# Patient Record
Sex: MALE | Race: WHITE | NOT HISPANIC OR LATINO | URBAN - METROPOLITAN AREA
[De-identification: names, ages, dates, MRNs, and addresses within clinical notes are randomized per-mention and may not be internally consistent; named-entity substitution may affect disease eponyms.]

---

## 2018-07-20 ENCOUNTER — COMMUNICATION - HEALTHEAST (OUTPATIENT)
Dept: SCHEDULING | Facility: CLINIC | Age: 33
End: 2018-07-20

## 2018-07-21 ENCOUNTER — OFFICE VISIT - HEALTHEAST (OUTPATIENT)
Dept: FAMILY MEDICINE | Facility: CLINIC | Age: 33
End: 2018-07-21

## 2018-07-21 DIAGNOSIS — I10 HTN (HYPERTENSION): ICD-10-CM

## 2018-07-21 DIAGNOSIS — L03.90 CELLULITIS: ICD-10-CM

## 2018-07-21 DIAGNOSIS — F41.1 GAD (GENERALIZED ANXIETY DISORDER): ICD-10-CM

## 2018-07-30 ENCOUNTER — OFFICE VISIT - HEALTHEAST (OUTPATIENT)
Dept: FAMILY MEDICINE | Facility: CLINIC | Age: 33
End: 2018-07-30

## 2018-07-30 DIAGNOSIS — I10 HYPERTENSION, UNSPECIFIED TYPE: ICD-10-CM

## 2018-07-30 DIAGNOSIS — E66.09 CLASS 2 OBESITY DUE TO EXCESS CALORIES WITHOUT SERIOUS COMORBIDITY WITH BODY MASS INDEX (BMI) OF 38.0 TO 38.9 IN ADULT: ICD-10-CM

## 2018-07-30 DIAGNOSIS — L40.3 PUSTULOSIS PALMARIS ET PLANTARIS: ICD-10-CM

## 2018-07-30 DIAGNOSIS — Z11.4 ENCOUNTER FOR SCREENING FOR HIV: ICD-10-CM

## 2018-07-30 DIAGNOSIS — F41.9 ANXIETY: ICD-10-CM

## 2018-07-30 DIAGNOSIS — E66.812 CLASS 2 OBESITY DUE TO EXCESS CALORIES WITHOUT SERIOUS COMORBIDITY WITH BODY MASS INDEX (BMI) OF 38.0 TO 38.9 IN ADULT: ICD-10-CM

## 2018-07-30 DIAGNOSIS — R21 RASH AND NONSPECIFIC SKIN ERUPTION: ICD-10-CM

## 2018-07-30 LAB
ALBUMIN SERPL-MCNC: 3.9 G/DL (ref 3.5–5)
ALP SERPL-CCNC: 45 U/L (ref 45–120)
ALT SERPL W P-5'-P-CCNC: 52 U/L (ref 0–45)
ANION GAP SERPL CALCULATED.3IONS-SCNC: 12 MMOL/L (ref 5–18)
AST SERPL W P-5'-P-CCNC: 24 U/L (ref 0–40)
BILIRUB SERPL-MCNC: 0.2 MG/DL (ref 0–1)
BUN SERPL-MCNC: 18 MG/DL (ref 8–22)
CALCIUM SERPL-MCNC: 9.9 MG/DL (ref 8.5–10.5)
CHLORIDE BLD-SCNC: 106 MMOL/L (ref 98–107)
CO2 SERPL-SCNC: 26 MMOL/L (ref 22–31)
CREAT SERPL-MCNC: 0.83 MG/DL (ref 0.7–1.3)
GFR SERPL CREATININE-BSD FRML MDRD: >60 ML/MIN/1.73M2
GLUCOSE BLD-MCNC: 96 MG/DL (ref 70–125)
HIV 1+2 AB+HIV1 P24 AG SERPL QL IA: NEGATIVE
POTASSIUM BLD-SCNC: 4.4 MMOL/L (ref 3.5–5)
PROT SERPL-MCNC: 7.2 G/DL (ref 6–8)
SODIUM SERPL-SCNC: 144 MMOL/L (ref 136–145)
TSH SERPL DL<=0.005 MIU/L-ACNC: 2.08 UIU/ML (ref 0.3–5)

## 2018-07-30 RX ORDER — BLOOD PRESSURE TEST KIT
KIT MISCELLANEOUS
Qty: 1 EACH | Refills: 0 | Status: SHIPPED | OUTPATIENT
Start: 2018-07-30

## 2018-07-30 ASSESSMENT — MIFFLIN-ST. JEOR: SCORE: 2022.16

## 2018-07-31 ENCOUNTER — RECORDS - HEALTHEAST (OUTPATIENT)
Dept: ADMINISTRATIVE | Facility: OTHER | Age: 33
End: 2018-07-31

## 2018-07-31 ENCOUNTER — COMMUNICATION - HEALTHEAST (OUTPATIENT)
Dept: FAMILY MEDICINE | Facility: CLINIC | Age: 33
End: 2018-07-31

## 2021-06-01 VITALS — HEIGHT: 67 IN | WEIGHT: 251 LBS | BODY MASS INDEX: 39.39 KG/M2

## 2021-06-01 VITALS — WEIGHT: 241 LBS

## 2021-06-16 PROBLEM — L40.3 PUSTULOSIS PALMARIS ET PLANTARIS: Status: ACTIVE | Noted: 2018-07-30

## 2021-06-19 NOTE — PROGRESS NOTES
Assessment:     1. Cellulitis  cephalexin (KEFLEX) 500 MG capsule    Ambulatory referral to Dermatology   2. HTN (hypertension)     3. JUAREZ (generalized anxiety disorder)            Plan:     Differential diagnosis include but not limited to skin condition, cellulitis, or contact dermatitis.  Discussed with patient that he need to stop touching his face constantly, washes hands thoroughly, use unscented and hypoallergenic products on his face.  Continue his treatment plan as he was advised at the [facility] ER until he sees a dermatologist.  We will try using antibiotics see if this would be helpful for his condition.  Monitor for worsening symptoms.  Patient has a scheduled appointment with a new provider, keep that appointment.    Subjective:       32 y.o. male presents for evaluation of his skin condition.  Patient reports that initially he started having spots on his face that started in January he has been having multiple treatments which sometimes resolve and sometimes they do not resolve.  He recently moved into the area from [place].  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       Patient reports that he has spots on the face that started in January and has not gone away  Incidental finding patient with elevated blood pressure 170/100  Pt here reports that he has has spots on his face  Hx of radiation, heals stays on the chin   Things coming out of them 3/30  He was told to do baths  Swollen lymph nodes then started having sx.  Since may they swell up didn't hurt and poped  He is on disability, uses.   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    Review of Systems  A 12 point comprehensive review of systems was negative except as noted.       Objective:      BP (!) 178/110  Pulse (!) 129  Resp 16  Wt (!) 241 lb (109.3 kg)  SpO2 98%  General appearance: alert, appears stated age, cooperative and anxious  Head: Normocephalic, without obvious abnormality, atraumatic  Eyes: conjunctivae/corneas clear. PERRL, EOM's intact. Fundi benign.  Ears: normal TM's and external ear canals both ears  Throat: lips, mucosa, and tongue normal; teeth and gums normal  Lungs: clear to auscultation bilaterally  Heart: regular rate and rhythm, S1, S2 normal, no murmur, click, rub or gallop and sinus tachyacardia  Extremities: extremities normal, atraumatic, no cyanosis or edema  Skin: Lesions noted under the chin, hard to palpate, painful, mild erythema noted on the right side of the face, no ecchymoses, some evidence of infection noted.    Neurologic: Grossly normal     This note has been dictated using voice recognition software. Any grammatical or context distortions are unintentional and inherent to the software
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
## 2022-11-08 ENCOUNTER — TELEPHONE (OUTPATIENT)
Dept: BEHAVIORAL HEALTH | Facility: CLINIC | Age: 37
End: 2022-11-08

## 2022-11-08 NOTE — TELEPHONE ENCOUNTER
Writer left a voicemail reminding patient of virtual appointment on 11/14 @ 10am. Provide office line for any questions. No further assistance.

## 2022-11-14 ENCOUNTER — TELEPHONE (OUTPATIENT)
Dept: BEHAVIORAL HEALTH | Facility: CLINIC | Age: 37
End: 2022-11-14

## 2022-11-14 NOTE — TELEPHONE ENCOUNTER
Writer called pt, to get them checked in. The call went to , writer left a message asking for a call back. And left message with number for callback and number for reschedule if need be.

## 2022-11-14 NOTE — TELEPHONE ENCOUNTER
Writer called Pt. Second time left message again, leaving a call back number to get checked in, and number for intake to reschedule.